# Patient Record
Sex: FEMALE | Race: WHITE | NOT HISPANIC OR LATINO | Employment: FULL TIME | RURAL
[De-identification: names, ages, dates, MRNs, and addresses within clinical notes are randomized per-mention and may not be internally consistent; named-entity substitution may affect disease eponyms.]

---

## 2018-06-26 VITALS — HEIGHT: 55 IN

## 2018-06-26 RX ORDER — DOCUSATE SODIUM 250 MG
250 CAPSULE ORAL DAILY
COMMUNITY

## 2018-06-26 RX ORDER — ACETAMINOPHEN 500 MG
1000 TABLET ORAL EVERY 6 HOURS PRN
COMMUNITY

## 2018-06-26 RX ORDER — LANSOPRAZOLE 30 MG/1
30 CAPSULE, DELAYED RELEASE ORAL DAILY
COMMUNITY
End: 2021-08-18 | Stop reason: ALTCHOICE

## 2018-06-26 RX ORDER — ALPHA LIPOIC ACID 200 MG
CAPSULE ORAL
COMMUNITY

## 2018-06-26 RX ORDER — MONTELUKAST SODIUM 10 MG/1
10 TABLET ORAL NIGHTLY
COMMUNITY

## 2018-06-26 RX ORDER — CHLORDIAZEPOXIDE HYDROCHLORIDE AND CLIDINIUM BROMIDE 5; 2.5 MG/1; MG/1
1 CAPSULE ORAL
COMMUNITY

## 2018-06-26 RX ORDER — FEXOFENADINE HCL 180 MG/1
180 TABLET ORAL DAILY
COMMUNITY

## 2018-07-02 ENCOUNTER — OFFICE VISIT (OUTPATIENT)
Dept: CARDIOLOGY | Facility: CLINIC | Age: 43
End: 2018-07-02

## 2018-07-02 VITALS
BODY MASS INDEX: 30.56 KG/M2 | WEIGHT: 179 LBS | DIASTOLIC BLOOD PRESSURE: 80 MMHG | HEART RATE: 72 BPM | SYSTOLIC BLOOD PRESSURE: 138 MMHG | HEIGHT: 64 IN

## 2018-07-02 DIAGNOSIS — R07.2 PRECORDIAL PAIN: ICD-10-CM

## 2018-07-02 DIAGNOSIS — R94.39 ABNORMAL STRESS ECG WITH TREADMILL: Primary | ICD-10-CM

## 2018-07-02 PROCEDURE — 93000 ELECTROCARDIOGRAM COMPLETE: CPT | Performed by: INTERNAL MEDICINE

## 2018-07-02 PROCEDURE — 99204 OFFICE O/P NEW MOD 45 MIN: CPT | Performed by: INTERNAL MEDICINE

## 2018-07-02 NOTE — PROGRESS NOTES
Parris Ihsan  1975  Date of Office Visit: 07/02/2018  Encounter Provider: Juan Jose Eagle MD  Place of Service: [facility] CARDIOLOGY      CHIEF COMPLAINT:  Equivocal stress test  Chest pain    HISTORY OF PRESENT ILLNESS:  Ms. Oro:    I had the pleasure of seeing your patient in consultation today secondary to chest discomfort and an abnormal stress test.  As you well know, she is a very pleasant, 43-year-old female with a medical history of gastroesophageal reflux disease, history of cholelithiasis and cholecystectomy, dyspnea on exertion, and more recently chest discomfort who presents to me for evaluation of her chest discomfort and equivocal stress test.  She states that, over the past month in duration, that she has noticed intermittent chest discomfort and shortness of air.  The chest pain varies in that it is substernal, central, radiating occasionally to bilateral shoulders, and also associated with some degree of reflux.  She has mild dyspnea on exertion, which has been present during the same time.  The dyspnea resolves with rest.  She has no dyspnea at rest.  She denies any bilateral lower extremity edema or recent long car or plane trips.  She does state that the discomfort has been slightly improved by Pepcid; however, the shortness of air is unchanged.  She did undergo a standard treadmill stress test, which was felt to be equivocal secondary to less than 0.5 mm to 0.75 mm of ST depression in the inferior leads.  She was able to tolerate 11 minutes and 40 seconds.  She did have dyspnea associated with that and some tightness but nothing that she described as pain.        Review of Systems   Constitution: Negative for fever, weakness and malaise/fatigue.   HENT: Negative for nosebleeds and sore throat.    Eyes: Negative for blurred vision and double vision.   Cardiovascular: Positive for chest pain. Negative for claudication, palpitations and syncope.    Respiratory: Negative for cough, shortness of breath and snoring.    Endocrine: Negative for cold intolerance, heat intolerance and polydipsia.   Skin: Negative for itching, poor wound healing and rash.   Musculoskeletal: Negative for joint pain, joint swelling, muscle weakness and myalgias.   Gastrointestinal: Negative for abdominal pain, melena, nausea and vomiting.   Neurological: Negative for light-headedness, loss of balance, seizures and vertigo.   Psychiatric/Behavioral: Negative for altered mental status and depression.       Past Medical History:   Diagnosis Date   • Gall stones    • GERD (gastroesophageal reflux disease)    • IBS (irritable bowel syndrome)    • PCOS (polycystic ovarian syndrome)        The following portions of the patient's history were reviewed and updated as appropriate: Social history , Family history and Surgical history     Current Outpatient Prescriptions on File Prior to Visit   Medication Sig Dispense Refill   • acetaminophen (TYLENOL) 500 MG tablet Take 1,000 mg by mouth Every 6 (Six) Hours As Needed for Mild Pain .     • B Complex Vitamins (B COMPLEX-B12) tablet Take  by mouth.     • clidinium-chlordiazePOXIDE (LIBRAX) 5-2.5 MG per capsule Take 1 capsule by mouth 4 (Four) Times a Day Before Meals & at Bedtime As Needed for Indigestion.     • docusate sodium (COLACE) 250 MG capsule Take 250 mg by mouth Daily.     • EPINEPHrine (EPIPEN IJ) Inject  as directed.     • Famotidine (PEPCID AC MAXIMUM STRENGTH) 20 MG chewable tablet Chew.     • fexofenadine (ALLEGRA) 180 MG tablet Take 180 mg by mouth Daily.     • lansoprazole (PREVACID) 30 MG capsule Take 30 mg by mouth Daily.     • montelukast (SINGULAIR) 10 MG tablet Take 10 mg by mouth Every Night.     • [DISCONTINUED] metFORMIN (GLUCOPHAGE) 500 MG tablet Take 500 mg by mouth Daily.       No current facility-administered medications on file prior to visit.        Allergies   Allergen Reactions   • Gatifloxacin Other (See Comments)  "lilia       Vitals:    07/02/18 1340   BP: 138/80   Pulse: 72   Weight: 81.2 kg (179 lb)   Height: 163.2 cm (64.25\")     Physical Exam   Constitutional: She is oriented to person, place, and time. She appears well-developed and well-nourished.   HENT:   Head: Normocephalic and atraumatic.   Eyes: Conjunctivae and EOM are normal. No scleral icterus.   Neck: Normal range of motion. Neck supple. Normal carotid pulses, no hepatojugular reflux and no JVD present. Carotid bruit is not present. No tracheal deviation present. No thyromegaly present.   Cardiovascular: Normal rate and regular rhythm.  Exam reveals no gallop and no friction rub.    No murmur heard.  Pulses:       Carotid pulses are 2+ on the right side, and 2+ on the left side.       Radial pulses are 2+ on the right side, and 2+ on the left side.        Femoral pulses are 2+ on the right side, and 2+ on the left side.       Dorsalis pedis pulses are 2+ on the right side, and 2+ on the left side.        Posterior tibial pulses are 2+ on the right side, and 2+ on the left side.   Pulmonary/Chest: Breath sounds normal. No respiratory distress. She has no decreased breath sounds. She has no wheezes. She has no rhonchi. She has no rales. She exhibits no tenderness.   Abdominal: Soft. Bowel sounds are normal. She exhibits no distension. There is no tenderness. There is no rebound.   Musculoskeletal: She exhibits no edema or deformity.   Neurological: She is alert and oriented to person, place, and time. She has normal strength. No sensory deficit.   Skin: No rash noted. No erythema.   Psychiatric: She has a normal mood and affect. Her behavior is normal.           No components found for: CBC  No results found for: CMP  No components found for: LIPID  No results found for: BMP      ECG 12 Lead  Date/Time: 7/2/2018 2:07 PM  Performed by: MARYLIN LONDON  Authorized by: MARYLIN LONDON   Comparison: compared with previous ECG from 6/14/2018  Similar " to previous ECG  Rhythm: sinus rhythm  Rate: normal  QRS axis: normal  Clinical impression: non-specific ECG  Comments: Nonspecific ST-T wave changes inferior leads at baseline.            DISCUSSION/SUMMARYThe patient is a very pleasant, 43-year-old female with a medical history of mild dyspnea on exertion and chest discomfort.  Her stress test actually pretty good in that she went around eleven-and-a-half minutes without symptoms that were consistent with angina.  Unfortunately, she did have some inferior ST depression that was slightly less than 1 mm.  It looked to be about 0.5 mm to 0.75 mm in amplitude.  This is equivocal; however, in the setting of her excellent exercise capacity, I think will likely end up being negative on perfusion imaging.  I do think that perfusion imaging added to this type of test would allow us to definitively say that she has a negative stress.  I do not think that we need to worry about pulmonary embolus at this time.  She is not tachycardic and her dyspnea is really only present with activity.  If her perfusion images are normal, I would not recommend any additional workup.

## 2018-07-10 ENCOUNTER — TELEPHONE (OUTPATIENT)
Dept: CARDIOLOGY | Facility: CLINIC | Age: 43
End: 2018-07-10

## 2018-07-10 NOTE — TELEPHONE ENCOUNTER
Pt called about the stress test Dr. Eagle ordered last Monday. She said she hadn't heard form us about scheduling it up at [facility]. He ordered a nuclear stress test for an abnormal treadmill.  She can be reached at #[phone].     Thanks,  Zoila

## 2018-07-10 NOTE — TELEPHONE ENCOUNTER
Pam, the authorization has been obtained for this test please arrange for this to be done at [facility].

## 2018-07-13 NOTE — TELEPHONE ENCOUNTER
Parris is calling back again because she has not heard.  She stated she has called three times.  She just wanted to make sure she didn't need to do anything.    Thanks  Merari     [phone]